# Patient Record
Sex: FEMALE | Race: OTHER | HISPANIC OR LATINO | URBAN - METROPOLITAN AREA
[De-identification: names, ages, dates, MRNs, and addresses within clinical notes are randomized per-mention and may not be internally consistent; named-entity substitution may affect disease eponyms.]

---

## 2020-08-18 ENCOUNTER — EMERGENCY (EMERGENCY)
Facility: HOSPITAL | Age: 25
LOS: 1 days | Discharge: HOME | End: 2020-08-18
Attending: EMERGENCY MEDICINE | Admitting: EMERGENCY MEDICINE
Payer: MEDICAID

## 2020-08-18 VITALS
HEIGHT: 62 IN | WEIGHT: 149.91 LBS | RESPIRATION RATE: 22 BRPM | DIASTOLIC BLOOD PRESSURE: 81 MMHG | SYSTOLIC BLOOD PRESSURE: 119 MMHG | TEMPERATURE: 99 F | OXYGEN SATURATION: 100 % | HEART RATE: 97 BPM

## 2020-08-18 DIAGNOSIS — Z90.49 ACQUIRED ABSENCE OF OTHER SPECIFIED PARTS OF DIGESTIVE TRACT: Chronic | ICD-10-CM

## 2020-08-18 LAB
APPEARANCE UR: ABNORMAL
BACTERIA # UR AUTO: ABNORMAL
BILIRUB UR-MCNC: NEGATIVE — SIGNIFICANT CHANGE UP
COLOR SPEC: YELLOW — SIGNIFICANT CHANGE UP
DIFF PNL FLD: ABNORMAL
EPI CELLS # UR: 3 /HPF — SIGNIFICANT CHANGE UP
GLUCOSE UR QL: NEGATIVE — SIGNIFICANT CHANGE UP
HYALINE CASTS # UR AUTO: 6 /LPF — HIGH (ref 0–2)
KETONES UR-MCNC: NEGATIVE — SIGNIFICANT CHANGE UP
LEUKOCYTE ESTERASE UR-ACNC: ABNORMAL
NITRITE UR-MCNC: POSITIVE
PH UR: 6 — SIGNIFICANT CHANGE UP (ref 5–8)
PROT UR-MCNC: ABNORMAL
RBC CASTS # UR COMP ASSIST: 5 /HPF — HIGH (ref 0–4)
SP GR SPEC: 1.02 — SIGNIFICANT CHANGE UP (ref 1.01–1.02)
UROBILINOGEN FLD QL: NEGATIVE — SIGNIFICANT CHANGE UP
WBC UR QL: 90 /HPF — HIGH (ref 0–5)

## 2020-08-18 PROCEDURE — 99283 EMERGENCY DEPT VISIT LOW MDM: CPT

## 2020-08-18 PROCEDURE — 87186 SC STD MICRODIL/AGAR DIL: CPT

## 2020-08-18 PROCEDURE — 99284 EMERGENCY DEPT VISIT MOD MDM: CPT

## 2020-08-18 PROCEDURE — 87086 URINE CULTURE/COLONY COUNT: CPT

## 2020-08-18 PROCEDURE — 81001 URINALYSIS AUTO W/SCOPE: CPT

## 2020-08-18 RX ORDER — CEFPODOXIME PROXETIL 100 MG
200 TABLET ORAL ONCE
Refills: 0 | Status: COMPLETED | OUTPATIENT
Start: 2020-08-18 | End: 2020-08-18

## 2020-08-18 RX ORDER — IBUPROFEN 200 MG
600 TABLET ORAL ONCE
Refills: 0 | Status: COMPLETED | OUTPATIENT
Start: 2020-08-18 | End: 2020-08-18

## 2020-08-18 RX ORDER — CEFPODOXIME PROXETIL 100 MG
1 TABLET ORAL
Qty: 20 | Refills: 0
Start: 2020-08-18 | End: 2020-08-27

## 2020-08-18 RX ADMIN — Medication 200 MILLIGRAM(S): at 11:22

## 2020-08-18 RX ADMIN — Medication 600 MILLIGRAM(S): at 10:43

## 2020-08-18 RX ADMIN — Medication 600 MILLIGRAM(S): at 11:23

## 2020-08-18 NOTE — ED PROVIDER NOTE - NSFOLLOWUPINSTRUCTIONS_ED_ALL_ED_FT
1) Please follow up with your Primary Care Provider in 24-48 hours  2) Seek immediate medical care for any new or returning symptoms including but not limited severe pain, high fevers, difficulty keeping down food and/or liquid  3) Take Cefpodoxime twice a day for 10 days  4) Take Tylenol 650 mg every 4-6 hours as needed for pain. Do not take more than 2 grams within a 24 hour period  5) Take Ibuprofen 400-600 mg every 4-6 hours as needed for pain. Do not take more than 1200 mg within a 24 hour period. Take this medication with food                                                Emergency Medicine Telemedicine Follow-Up Program      What is telemedicine?     Telemedicine is a method of video communication between you and your doctor using a computer or mobile device. To participate in telemedicine, you can use your smartphone, tablet, desktop, or laptop. It is a new and exciting way that healthcare providers can stay in close communication with their patients. In the [facility] emergency departments, we are now using telemedicine to speak with patients shortly after their discharge from the emergency department.      How will telemedicine help me after my emergency department visit?    While your visit to the emergency department did not require you to stay in the hospital for additional testing or treatment, your Emergency Medicine provider wants to be sure that you are recovering properly. During your telemedicine appointment, an Emergency Medicine doctor will ask you questions about your health since your visit to the emergency department. Our hope is to prevent unnecessary returns to the emergency department and avoid admission to the hospital when possible.    While we are extremely concerned about your long term health, during the telemedicine follow-up visit we will only be able to address the issue that brought you to the emergency department. To ensure good continuity of care, you will still need to schedule a visit with your primary care provider as you would have otherwise.      How will I know what to do next?    If your Emergency Medicine provider feels that you could benefit from a telemedicine follow-up appointment, they will ask you for your phone number and what time you are available for a telemedicine appointment. You will receive an email with the details of this appointment before you leave the emergency department.    Approximately 15 minutes before your appointment, one of [facility]’s team members will call to ask you to consent to the appointment. Then the team member will help you enter the virtual telemedicine room. Once you are in the virtual room, the telemedicine doctor will join and our team member will then leave the room. This doctor will not be the same doctor who you saw in the emergency department, but they will be able to see the medical records from your emergency department visit.      Will I be charged for the telemedicine visit?    Your insurance will be billed for the telemedicine visit. When you speak with our team member before your appointment, you will be asked to consent to the appointment, including: “I understand that there may be costs associated with a telehealth visit. I agree that I am responsible for any fees associated with the telehealth services that I receive.”    If you do not have insurance, you can participate in telemedicine. You will receive a bill after the visit, then you can call the number located on the bill for payment assistance. The number will direct you to a team member who will assist you with completing financial assistance applications.      What if I need to change or cancel my appointment?    If you need to change or cancel your appointment, or if you have any questions, feel free to call our 24/7 hotline. One of our team members will be able to help you. Our number is [phone].      What do I need to do before I talk to the telemedicine doctor?    We recommend that you connect with a strong Wi-Fi signal on a fully-charged device. Our team member will guide you through entering the telemedicine room.    __________________________________________________________________________________________________________________ 1) Please follow up with your Primary Care Provider in 24-48 hours  2) Seek immediate medical care for any new or returning symptoms including but not limited severe pain, high fevers, difficulty keeping down food and/or liquid  3) Take Cefpodoxime twice a day for 10 days  4) Take Tylenol 650 mg every 4-6 hours as needed for pain. Do not take more than 2 grams within a 24 hour period  5) Take Ibuprofen 400-600 mg every 4-6 hours as needed for pain. Do not take more than 1200 mg within a 24 hour period. Take this medication with food                                                  Emergency Medicine Telemedicine Follow-Up Program    What is telemedicine?     Telemedicine is a method of video communication between you and your doctor using a computer or mobile device. To participate in telemedicine, you can use your smartphone, tablet, desktop, or laptop. It is a new and exciting way that healthcare providers can stay in close communication with their patients. In the [facility] emergency departments, we are now using telemedicine to speak with patients shortly after their discharge from the emergency department.      How will telemedicine help me after my emergency department visit?    While your visit to the emergency department did not require you to stay in the hospital for additional testing or treatment, your Emergency Medicine provider wants to be sure that you are recovering properly. During your telemedicine appointment, an Emergency Medicine doctor will ask you questions about your health since your visit to the emergency department. Our hope is to prevent unnecessary returns to the emergency department and avoid admission to the hospital when possible.    While we are extremely concerned about your long term health, during the telemedicine follow-up visit we will only be able to address the issue that brought you to the emergency department. To ensure good continuity of care, you will still need to schedule a visit with your primary care provider as you would have otherwise.      How will I know what to do next?    If your Emergency Medicine provider feels that you could benefit from a telemedicine follow-up appointment, they will ask you for your phone number and what time you are available for a telemedicine appointment. You will receive an email with the details of this appointment before you leave the emergency department.    Approximately 15 minutes before your appointment, one of [facility]’s team members will call to ask you to consent to the appointment. Then the team member will help you enter the virtual telemedicine room. Once you are in the virtual room, the telemedicine doctor will join and our team member will then leave the room. This doctor will not be the same doctor who you saw in the emergency department, but they will be able to see the medical records from your emergency department visit.      Will I be charged for the telemedicine visit?    Your insurance will be billed for the telemedicine visit. When you speak with our team member before your appointment, you will be asked to consent to the appointment, including: “I understand that there may be costs associated with a telehealth visit. I agree that I am responsible for any fees associated with the telehealth services that I receive.”    If you do not have insurance, you can participate in telemedicine. You will receive a bill after the visit, then you can call the number located on the bill for payment assistance. The number will direct you to a team member who will assist you with completing financial assistance applications.      What if I need to change or cancel my appointment?    If you need to change or cancel your appointment, or if you have any questions, feel free to call our 24/7 hotline. One of our team members will be able to help you. Our number is [phone].      What do I need to do before I talk to the telemedicine doctor?    We recommend that you connect with a strong Wi-Fi signal on a fully-charged device. Our team member will guide you through entering the telemedicine room.    __________________________________________________________________________________________________________________

## 2020-08-18 NOTE — ED PROVIDER NOTE - SKIN NEGATIVE STATEMENT, MLM
RETINA IS ATTACHED OU: NO HOLES OR TEARS SEEN ON DILATED EXAM TODAY.  RETINAL DETACHMENT SIGNS AND SYMPTOMS REVIEWED no abrasions,  no rashes.

## 2020-08-18 NOTE — ED PROVIDER NOTE - PROGRESS NOTE DETAILS
UA c/w pyelo. Well appearing, comfortable. VSS. Will dc with ABX. Discussed pain control, good hydration. TeleHealth f/u appt requested. Discussed return precautions

## 2020-08-18 NOTE — ED PROVIDER NOTE - PHYSICAL EXAMINATION
General: NAD, mildly uncomfortable in bed  HEENT: normocephalic, atraumatic, EOMI, PERRL  CV: normal S1/S2, no rubs, murmurs, gallops, 2+ radial/DP/PT pulses; no peripheral edema  Resp: CTABL, no wheezes, rales, rhonchi  Abd: +right CVA tenderness; abd soft, NT, ND; no rebound tenderness or guarding; no suprapubic tenderness  Neuro: A&Ox3; moving all extremities spontaneously  Skin: no rashes or lesions  Psych: affect appropriate to situation

## 2020-08-18 NOTE — ED PROVIDER NOTE - ATTENDING CONTRIBUTION TO CARE
Connie Erickson MD - Attending Physician: I have personally seen and examined this patient with the resident/fellow.  I have fully participated in the care of this patient. I have reviewed all pertinent clinical information, including history, physical exam, plan and the Resident/Fellow’s note and agree except as noted. See MDM

## 2020-08-18 NOTE — ED ADULT NURSE NOTE - NSIMPLEMENTINTERV_GEN_ALL_ED
Implemented All Universal Safety Interventions:  [place] to call system. Call bell, personal items and telephone within reach. Instruct patient to call for assistance. Room bathroom lighting operational. Non-slip footwear when patient is off stretcher. Physically safe environment: no spills, clutter or unnecessary equipment. Stretcher in lowest position, wheels locked, appropriate side rails in place.

## 2020-08-18 NOTE — ED PROVIDER NOTE - CLINICAL SUMMARY MEDICAL DECISION MAKING FREE TEXT BOX
25yF with no sig PMH presenting with 3 days of flank pain, dysuria, and increased urinary frequency concerning for UTI and pyelonephritis. Low suspicion for PID, bowel obstruction, or ovarian pathology. Will order UA, UCx, Ubeta, and Moltrin for pain. 25yF with no sig PMH presenting with 3 days of flank pain, dysuria, and increased urinary frequency concerning for UTI and pyelonephritis. Low suspicion for PID, bowel obstruction, or ovarian pathology. Will order UA, UCx, Ubeta, and Moltrin for pain.    Connie Erickson MD - Attending Physician: Pt here with R flank pain for 3 days, urinary frequency. No measured fever/vomiting. Likely pyelo. Low concern for other GI,  cause. UA, pain control

## 2020-08-18 NOTE — ED PROVIDER NOTE - OBJECTIVE STATEMENT
25yF with no significant PMH presenting with 3 days of flank pain. Reports pain started Friday in right flank area. Pain is constant and only minimally relieved with ibuprofen (last dose 3am today). Worse when walking around. Endorses increased urinary frequency, dysuria, subjective fever, and chills.  Denies nausea, vomiting, diarrhea, and vaginal discharge. Denies chest pain and SOB. 25yF with no significant PMH presenting with 3 days of flank pain. Reports pain started in right flank area. Pain is constant and only minimally relieved with ibuprofen (last dose 3am today). Worse when walking around. Endorses increased urinary frequency, subjective fever, and chills.  Notes malodorous urine but no dysuria. Denies nausea, vomiting, diarrhea, and vaginal discharge. Denies chest pain and SOB. Sexually active, +protection, no prior STI, no vaginal/pelvic complaints.

## 2020-08-19 PROBLEM — K81.9 CHOLECYSTITIS, UNSPECIFIED: Chronic | Status: ACTIVE | Noted: 2020-08-18

## 2020-08-20 ENCOUNTER — APPOINTMENT (OUTPATIENT)
Dept: EMERGENCY DEPT | Facility: TELEHEALTH | Age: 25
End: 2020-08-20
Payer: MEDICAID

## 2020-08-20 ENCOUNTER — NON-APPOINTMENT (OUTPATIENT)
Age: 25
End: 2020-08-20

## 2020-08-20 PROBLEM — Z00.00 ENCOUNTER FOR PREVENTIVE HEALTH EXAMINATION: Status: ACTIVE | Noted: 2020-08-20

## 2020-08-20 PROCEDURE — 99203 OFFICE O/P NEW LOW 30 MIN: CPT | Mod: 95

## 2020-08-20 NOTE — HISTORY OF PRESENT ILLNESS
[Home] : at home, [unfilled] , at the time of the visit. [Other Location: e.g. Home (Enter Location, City,State)___] : at [unfilled] [Verbal consent obtained from patient] : the patient, [unfilled] [FreeTextEntry8] : 25F no hx seen in ED 3d ago for R flank pain, had urinary sx, UTI as well and was dx w/pyelo, placed on cefpodoxime.  Now seen as telehealth.  Improved yet persistent fevers, chills and R intermittent dull mild improving flank pain.  No dysuriua.

## 2020-08-20 NOTE — ED POST DISCHARGE NOTE - DETAILS
Zvi Dubin, MD note: Seen as telehealth.  sensetivities noted.  Given persistent sx after 3d on cefpodox & some resistance on UCx, new rx for bactrim sent. Pt understood need to change meds.

## 2020-08-20 NOTE — REVIEW OF SYSTEMS
[Fever] : fever [Chills] : chills [Vomiting] : no vomiting [Frequency] : frequency [Dysuria] : dysuria [Negative] : Heme/Lymph [FreeTextEntry7] : R flank pain

## 2022-07-22 NOTE — ED PROVIDER NOTE - PATIENT PORTAL LINK FT
You can access the FollowMyHealth Patient Portal offered by [facility] by registering at the following website: [email or web address]. By joining [employer]’s FollowMyHealth portal, you will also be able to view your health information using other applications (apps) compatible with our system.
123.2